# Patient Record
(demographics unavailable — no encounter records)

---

## 2017-01-31 NOTE — HP
COWS





- Scale


Resting Pulse: 2= -120


Sweatin= Chills/Flushing


Restless Observation: 3= Extraneous Movement


Pupil Size: 0= Normal to Room Light


Bone or Joint Aches: 1= Mild Discomfort


Runny Nose/ Eye Tearin= Runny Nose/Eyes


GI Upset > 30mins: 3= Vomiting/Diarrhea


Tremor Observation: 2= Slight Tremor Visible


Yawning Observation: 0= None


Anxiety or Irritability: 2=Irritable/Anxious


Goose Flesh Skin: 0=Smooth Skin


COWS Score: 16





Admission [facility]





- [facility]


Chief Complaint: 


withdrawal sx


Allergies/Adverse Reactions: 


 Allergies











Allergy/AdvReac Type Severity Reaction Status Date / Time


 


No Known Allergies Allergy   Verified 17 17:49











History of Present Illness: 


30 years old male with long history of opiate nicotine dependence, denies 

medical issue has bipolar ii longest sobriety 3 years is admitted to detox


Exam Limitations: No Limitations





- Ebola screening


Have you traveled outside of the country in the last 21 days: No


Have you had contact with anyone from an Ebola affected area: No


Have you been sick,other than usual withdrawal symptoms: No


Do you have a fever: No





- Review of Systems


Constitutional: Chills, Loss of Appetite, Changes in sleep, Unexplained wgt Loss


EENT: reports: Blurred Vision (left eye x "months")


Respiratory: reports: Cough, SOB at Rest


Cardiac: reports: Palpitations


GI: reports: Diarrhea, Nausea, Poor Appetite, Poor Fluid Intake, Vomiting, 

Indigestion, Abdominal cramping


: reports: No Symptoms Reported


Musculoskeletal: reports: Back Pain


Integumentary: reports: No Symptoms Reported


Neuro: reports: Tremors


Endocrine: reports: No Symptoms Reported


Hematology: reports: No Symptoms Reported


Psychiatric: reports: Judgement Intact, Orientated x3


Other Systems: Reviewed and Negative





Patient History





- Patient Medical History


Hx Anemia: No


Hx Asthma: No


Hx Chronic Obstructive Pulmonary Disease (COPD): No


Hx Cancer: No


Hx Cardiac Disorders: No


Hx Congestive Heart Failure: No


Hx Hypertension: No


Hx Hypercholesterolemia: No


Hx Pacemaker: No


HX Cerebrovascular Accident: No


Hx Seizures: No


Hx Dementia: No


Hx Diabetes: No


Hx Gastrointestinal Disorders: No


Hx Liver Disease: No


Hx Genitourinary Disorders: No


Hx Sexually Transmitted Disorders: No


Hx Renal Disease (ESRD): No


Hx Thyroid Disease: No


Hx Human Immunodeficiency Virus (HIV): No


Hx Hepatitis C: No


Hx Depression: No


Hx Suicide Attempt: No


Hx Bipolar Disorder: Yes


Hx Schizophrenia: No





- Patient Surgical History


Past Surgical History: No





- PPD History


Previous Implant?: Yes


Documented Results: Negative w/o proof


Implanted On Prior [facility] Admission?: No


PPD to be Administered?: Yes





- Smoking Cessation


Smoking history: Current every day smoker


Have you smoked in the past 12 months: Yes


Aproximately how many cigarettes per day: 10


Cigars Per Day: 0


Hx Chewing Tobacco Use: No


Initiated information on smoking cessation: Yes


'Breaking Loose' booklet given: 17





- Substance & Tx. History


Hx Alcohol Use: No


Hx Substance Use: Yes


Substance Use Type: Cocaine, Marijuana, Opiates


Hx Substance Use Treatment: Yes





- Substances Abused


  ** Heroin


Route: Inhalation


Frequency: Daily


Amount used: 20 bags


Age of first use: 20


Date of Last Use: 17





Family Disease History





- Family Disease History


Family Disease History: Diabetes: Father, Heart Disease: Father, Other: Mother (

kidney)





Admission Physical Exam [facility]





- Vital Signs


Vital Signs: 


 Vital Signs - 24 hr











  17





  17:34


 


Temperature 99.2 F


 


Pulse Rate 102 H


 


Respiratory 20





Rate 


 


Blood Pressure 126/81














- Physical


General Appearance: Yes: Appropriately Dressed, Moderate Distress, Thin, 

Tremorous, Irritable, Sweating, Anxious


HEENTM: Yes: Hearing grossly Normal, Normal ENT Inspection, Normocephalic, 

Normal Voice


Neck: Yes: Supple, Trachea in good position


Breast: Yes: Breasts Symetrical


Cardiology: Yes: Tachycardia


Abdominal: Yes: Non Tender, Soft, Increased Bowel Sounds


Genitourinary: Yes: Within Normal Limits


Back: Yes: Normal Inspection


Musculoskeletal: Yes: full range of Motion, Gait Steady, Back pain


Extremities: Yes: Normal Range of Motion, Non-Tender, Tremors


Neurological: Yes: Fully Oriented, Alert, Motor Strength 5/5, Normal Response


Integumentary: Yes: Warm


Lymphatic: Yes: Within Normal Limits





- Diagnostic


(1) Opioid dependence with withdrawal


Current Visit: Yes   Status: Acute





(2) Cocaine dependence, uncomplicated


Current Visit: Yes   Status: Chronic





(3) Cannabis dependence, uncomplicated


Current Visit: Yes   Status: Chronic





(4) Nicotine dependence


Current Visit: Yes   Status: Acute   Qualifiers: 


     Nicotine product type: cigarettes     Substance use status: in withdrawal 

       Qualified Code(s): F17.213 - Nicotine dependence, cigarettes, with 

withdrawal  





(5) Weight loss


Current Visit: Yes   Status: Acute





(6) Bipolar II disorder


Current Visit: Yes   Status: Suspected





(7) GERD (gastroesophageal reflux disease)


Current Visit: Yes   Status: Acute   Qualifiers: 


     Esophagitis presence: without esophagitis        Qualified Code(s): K21.9 

- Gastro-esophageal reflux disease without esophagitis  








Cleared for Admission S





- Detox or Rehab


[facility] Level of Care: Medically Managed


Detox Regimen/Protocol: Methadone





S Breath Alcohol Content


Breath Alcohol Content: 0





Urine Drug Screen





- Control


Is Test Valid: Yes





- Results


Drug Screen Negative: No


Urine Drug Screen Results: THC-Marijuana, RIYA-Cocaine, OPI-Opiates, MTD-

Methadone, TCA-Tricyclic Antidepress, OXY-Oxycodone

## 2017-02-01 NOTE — PN
[facility] COWS





- Scale


Resting Pulse: 2= -120


Sweatin=Flushed/Facial Moisture


Restless Observation: 1= Difficult to Sit Still


Pupil Size: 0= Normal to Room Light


Bone or Joint Aches: 2= Severe Diffuse Aches


Runny Nose/ Eye Tearin= None


GI Upset > 30mins: 2= Nausea/Diarrhea


Tremor Observation of Outstretched Hands: 2= Slight Tremor Visible


Yawning Observation: 1= 1-2x During Session


Anxiety or Irritability: 2=Irritable/Anxious


Goose Flesh Skin: 3=Piloerection


COWS Score: 17





BHS Progress Note (SOAP)


Subjective: 


nausea


sweats


muscle spasms


shakes


agitation


interrupted sleep


irritable


Objective: 





17 10:39


 Vital Signs











Temperature  95.7 F L  17 09:52


 


Pulse Rate  124 H  17 09:52


 


Respiratory Rate  16   17 09:52


 


Blood Pressure  129/77   17 09:52


 


O2 Sat by Pulse Oximetry (%)      








 Laboratory Tests











  17





  19:45 07:00 07:00


 


WBC   6.7 


 


RBC   4.47 


 


Hgb   13.8 


 


Hct   40.6 


 


MCV   90.9 


 


MCHC   33.9 


 


RDW   13.7 


 


Plt Count   329 


 


MPV   8.3 


 


Sodium    141


 


Potassium    4.2


 


Chloride    104


 


Carbon Dioxide    29


 


Anion Gap    8


 


BUN    12


 


Creatinine    0.9


 


Creat Clearance w eGFR    > 60


 


Random Glucose    87


 


Calcium    9.1


 


Total Bilirubin    0.6


 


AST    21


 


ALT    35


 


Alkaline Phosphatase    79


 


Total Protein    7.8


 


Albumin    4.1


 


Urine Color  Dkyellow  


 


Urine Appearance  Cloudy  


 


Urine pH  7.0  


 


Ur Specific Gravity  1.030  


 


Urine Protein  1+ H  


 


Urine Glucose (UA)  Negative  


 


Urine Ketones  2+ H  


 


Urine Blood  Negative  


 


Urine Nitrite  Negative  


 


Urine Bilirubin  Negative  


 


Urine Urobilinogen  2.0 e.u/dl  


 


Ur Leukocyte Esterase  Negative  


 


Urine RBC  7  


 


Urine WBC  17  


 


Urine Mucus  Many  


 


Urine Yeast  Many  








repeat u/a


awake/alert


ambulating


no acute distress


Assessment: 





17 10:39


withdrawal sx


Plan: 


continue detox


increase fluids


clonidine 0.1mg x one


flexiril 10mg prn

## 2017-02-01 NOTE — EKG
Test Reason : 

Blood Pressure : ***/*** mmHG

Vent. Rate : 085 BPM     Atrial Rate : 085 BPM

   P-R Int : 124 ms          QRS Dur : 092 ms

    QT Int : 366 ms       P-R-T Axes : 037 076 044 degrees

   QTc Int : 435 ms

 

NORMAL SINUS RHYTHM

MODERATE VOLTAGE CRITERIA FOR LVH, MAY BE NORMAL VARIANT

BORDERLINE ECG

NO PREVIOUS ECGS AVAILABLE

Confirmed by DELANEY GAYTAN MD ([id number]) on 2/1/2017 12:41:29 PM

 

Referred By:             Confirmed By:DELANEY GAYTAN MD

## 2017-02-01 NOTE — CONSULT
[facility] Psychiatric Consult





- Data


Date of interview: 02/01/17


Admission source: [facility]


Identifying data: This is 30 years old male with history of Bipolar disorder 

intoxicated with Heroin and Nicotine, Cannabis, Cocaine


Substance Abuse History: - Smoking Cessation.  Smoking history: Current every 

day smoker.  Have you smoked in the past 12 months: Yes.  Aproximately how many 

cigarettes per day: 10.  Cigars Per Day: 0.  Hx Chewing Tobacco Use: No.  

Initiated information on smoking cessation: Yes.  'Breaking Loose' booklet given

: 01/31/17.  - Substance & Tx. History.  Hx Alcohol Use: No.  Hx Substance Use: 

Yes.  Substance Use Type: Cocaine, Marijuana, Opiates.  Hx Substance Use 

Treatment: Yes.  - Substances Abused.  ** Heroin.  Route: Inhalation.  Frequency

: Daily.  Amount used: 20 bags.  Age of first use: 20.  Date of Last Use: 01/30/ 17


Medical History: GERD, Weight loss history


Psychiatric History: Patient reprotsm unclear history of Bipolar disorder, 

demies history of psychiatric admissions, reports no medications taking prior 

to admission


Physical/Sexual Abuse/Trauma History: Denies


Additional Comment: 0bservation.  Detox Unit Care Protocol.  Urine Drug Screen 

Results: THC-Marijuana, RIYA-Cocaine, OPI-Opiates, MTD-Methadone, TCA-Tricyclic 

Antidepress, OXY-Oxycodone





Mental Status Exam





- Mental Status Exam


Alert and Oriented to: Person


Cognitive Function: Fair


Patient Appearance: Well Groomed


Mood: Apprehensive


Affect: Mood Congruent


Patient Behavior: Cooperative


Speech Pattern: Appropriate


Voice Loudness: Normal


Thought Process: Goal Oriented


Thought Disorder: Being Controlled


Hallucinations: Denies


Suicidal Ideation: Denies


Homicidal Ideation: Denies


Sleep: Difficulty falling asleep


Appetite: Fair


Muscle strength/Tone: Normal


Gait/Station: Normal


Additional Comments: 0bservation.  Detox Unit Care Protocol





Psychiatric Findings





- Problem List (Axis 1, 2,3)


(1) Nicotine dependence


Current Visit: Yes   Status: Acute   Qualifiers: 


     Nicotine product type: cigarettes     Substance use status: in withdrawal 

       Qualified Code(s): F17.213 - Nicotine dependence, cigarettes, with 

withdrawal  





(2) Opioid dependence with withdrawal


Current Visit: Yes   Status: Acute





(3) Cannabis dependence, uncomplicated


Current Visit: Yes   Status: Chronic





(4) Cocaine dependence, uncomplicated


Current Visit: Yes   Status: Chronic





(5) Drug-induced mood disorder


Current Visit: Yes   Status: Acute








- Initial Treatment Plan


Initial Treatment Plan: 0bservation.  Detox Unit Care Protocol

## 2017-02-02 NOTE — PN
[facility] COWS





- Scale


Resting Pulse: 1= WA 


Sweatin=Flushed/Facial Moisture


Restless Observation: 1= Difficult to Sit Still


Pupil Size: 0= Normal to Room Light


Bone or Joint Aches: 1= Mild Discomfort


Runny Nose/ Eye Tearin= Runny Nose/Eyes


GI Upset > 30mins: 0= None


Tremor Observation of Outstretched Hands: 2= Slight Tremor Visible


Yawning Observation: 2= >3x During Session


Anxiety or Irritability: 1=Feels Anxious/Irritable


Goose Flesh Skin: 0=Smooth Skin


COWS Score: 12





BHS Progress Note (SOAP)


Subjective: 


nasal excoriation


sweats 


shakes


agitation


interrupted sleep


Objective: 


17 10:27


 Vital Signs











Temperature  96.4 F L  17 09:31


 


Pulse Rate  79   17 09:31


 


Respiratory Rate  18   17 09:31


 


Blood Pressure  116/66   17 09:31


 


O2 Sat by Pulse Oximetry (%)      








 Laboratory Tests











  17





  19:45 07:00 07:00


 


WBC   6.7 


 


RBC   4.47 


 


Hgb   13.8 


 


Hct   40.6 


 


MCV   90.9 


 


MCHC   33.9 


 


RDW   13.7 


 


Plt Count   329 


 


MPV   8.3 


 


Sodium    141


 


Potassium    4.2


 


Chloride    104


 


Carbon Dioxide    29


 


Anion Gap    8


 


BUN    12


 


Creatinine    0.9


 


Creat Clearance w eGFR    > 60


 


Random Glucose    87


 


Calcium    9.1


 


Total Bilirubin    0.6


 


AST    21


 


ALT    35


 


Alkaline Phosphatase    79


 


Total Protein    7.8


 


Albumin    4.1


 


Urine Color  Dkyellow  


 


Urine Appearance  Cloudy  


 


Urine pH  7.0  


 


Ur Specific Gravity  1.030  


 


Urine Protein  1+ H  


 


Urine Glucose (UA)  Negative  


 


Urine Ketones  2+ H  


 


Urine Blood  Negative  


 


Urine Nitrite  Negative  


 


Urine Bilirubin  Negative  


 


Urine Urobilinogen  2.0 e.u/dl  


 


Ur Leukocyte Esterase  Negative  


 


Urine RBC  7  


 


Urine WBC  17  


 


Urine Mucus  Many  


 


Urine Yeast  Many  


 


RPR Titer   


 


Hepatitis C Antibody   














  17





  07:00 07:00


 


WBC  


 


RBC  


 


Hgb  


 


Hct  


 


MCV  


 


MCHC  


 


RDW  


 


Plt Count  


 


MPV  


 


Sodium  


 


Potassium  


 


Chloride  


 


Carbon Dioxide  


 


Anion Gap  


 


BUN  


 


Creatinine  


 


Creat Clearance w eGFR  


 


Random Glucose  


 


Calcium  


 


Total Bilirubin  


 


AST  


 


ALT  


 


Alkaline Phosphatase  


 


Total Protein  


 


Albumin  


 


Urine Color  


 


Urine Appearance  


 


Urine pH  


 


Ur Specific Gravity  


 


Urine Protein  


 


Urine Glucose (UA)  


 


Urine Ketones  


 


Urine Blood  


 


Urine Nitrite  


 


Urine Bilirubin  


 


Urine Urobilinogen  


 


Ur Leukocyte Esterase  


 


Urine RBC  


 


Urine WBC  


 


Urine Mucus  


 


Urine Yeast  


 


RPR Titer  Nonreactive 


 


Hepatitis C Antibody   <0.1














repeat u/a


awake/alert


ambulating


no acute distress





Assessment: 





17 10:29


withdrawal sx


Plan: 


continue detox


increase fluids


repeat u/a result pending


muprocin oint to nasal area ordered

## 2017-02-03 NOTE — PN
[facility] Progress Note (SOAP)


Subjective: 


ALERT,IRRITABLE,ANXIOUS,INTERRUPTED SLEEP,PAIN IN THE BODY AND BACK


Objective: 





02/03/17 10:50


 Vital Signs











Temperature  97.7 F   02/03/17 06:00


 


Pulse Rate  72   02/03/17 06:00


 


Respiratory Rate  18   02/03/17 06:00


 


Blood Pressure  109/60   02/03/17 06:00


 


O2 Sat by Pulse Oximetry (%)      











Assessment: 





02/03/17 10:50


WITHDRAWAL SYMPTOM


Plan: 


CONTINUE DETOX

## 2017-02-04 NOTE — PN
BHS Progress Note (SOAP)


Subjective: 


ALERT,IRRITABLE,ANXIOUS,INTERRUPTED SLEEP


Objective: 





02/04/17 11:44


 Vital Signs











Temperature  97.7 F   02/04/17 10:00


 


Pulse Rate  88   02/04/17 10:00


 


Respiratory Rate  18   02/04/17 10:00


 


Blood Pressure  109/75   02/04/17 10:00


 


O2 Sat by Pulse Oximetry (%)      











Assessment: 





02/04/17 11:44


WITHDRAWAL SYMPTOM


Plan: 


CONTINUE DETOX

## 2017-02-05 NOTE — HP
[facility] MD Rehab Assess/Revision





- Admission History


Ad[facility]hab from: [facility]


Date of Admission to Rehab: 02/05/17





- Vital signs


Vital Signs: 


 Vital Signs











 Period  Temp  Pulse  Resp  BP Sys/Rothman  Pulse Ox


 


 Last 24 Hr  98.7 F  79    112/78  














- Findings


Detox History & Physical reviewed: Yes


Concur with findings: Yes


Comments/Additional Findings: FOR REHAB AS PROTOCOL

## 2017-02-05 NOTE — DS
[facility] Detox Discharge Summary


Admission Date: 


01/31/17





Discharge Date: 02/05/17





- History


Present History: Cannabis Dependence, Cocaine Dependence, Opioid Dependence


Additional Comments: 


FOLLOW UP WITH AFTER CARE PROGRAM AS ARRANGEMENT [facility]


Pertinent Past History: 


GERD


WEIGHT LOSS


BIPOLAR 2 DISORDER 2 DISORDER





- Physical Exam Results


Vital Signs: 


 Vital Signs











Temperature  97.0 F L  02/05/17 07:36


 


Pulse Rate  79   02/05/17 07:36


 


Respiratory Rate  18   02/05/17 07:36


 


Blood Pressure  104/63   02/05/17 07:36


 


O2 Sat by Pulse Oximetry (%)      











Pertinent Admission Physical Exam Findings: 


WITHDRAWAL SYMPTOM





- Treatment


Hospital Course: Detox Protocol Followed, Detoxed Safely, Responded well, 

Discharged Condition Good, Rehab Referral Accepted


Patient has Accepted a Rehab Referral to: [facility]





- Medication


Discharge Medications: 


Ambulatory Orders





Unobtainable [Unobtainable]  01/31/17 











- AMA


Did Patient Leave Against Medical Advice: No

## 2017-02-05 NOTE — PN
[facility] Progress Note (SOAP)


Subjective: 


ALERT,NO COMPLAINT


Objective: 





02/05/17 10:35


 Vital Signs











Temperature  97.0 F L  02/05/17 07:36


 


Pulse Rate  79   02/05/17 07:36


 


Respiratory Rate  18   02/05/17 07:36


 


Blood Pressure  104/63   02/05/17 07:36


 


O2 Sat by Pulse Oximetry (%)      











Assessment: 





02/05/17 10:35


DETOX COMPLETED,NO WITHDRAWAL SYMPTOM


Plan: 


DISCHARGE TODAY,FOLLOW UP WITH AFTER CARE PROGRAM [facility] AS ARRANGEMENT

## 2017-02-08 NOTE — PN
Psychiatric Progress Note


Vital Signs: 


 Vital Signs











 Period  Temp  Pulse  Resp  BP Sys/Rothman  Pulse Ox


 


 Last 24 Hr  98 F  76-83  16-18  116-116/70-70  











Date of Session: 02/08/17


Chief Complaint:: AMA Psychiatrist Discharge Note


HPI: Patient addressing Opoid, Cocaine and Cannabis Dependence comorbid with 

Nicotine Dependence and Substance-Induced Mood Disorder


ROS: GERD


Current Medications: 


Active Medications











Generic Name Dose Route Start Last Admin





  Trade Name Freq  PRN Reason Stop Dose Admin


 


Acetaminophen  650 mg 02/05/17 14:29 02/08/17 10:10





  Tylenol -  PO   650 mg





  Q4H PRN   Administration





  FEVER OR PAIN   


 


Al Hydroxide/Mg Hydroxide  30 ml 02/05/17 14:29  





  Mylanta Oral Suspension -  PO   





  Q6H PRN   





  DYSPEPSIA   


 


Clonidine  0.1 mg 02/06/17 22:00 02/08/17 10:10





  Catapres -  PO 02/09/17 23:59  0.1 mg





  BID LUIS   Administration


 


Cyclobenzaprine HCl  10 mg 02/06/17 13:29 02/08/17 06:17





  Flexeril -  PO 02/09/17 23:59  10 mg





  TID PRN   Administration





  MUSCLE SPASMS   


 


Diphenhydramine HCl  50 mg 02/05/17 14:29 02/07/17 21:45





  Benadryl -  PO   50 mg





  HSMR1 PRN   Administration





  FOR ITCHING   


 


Eucalyptus/Menthol/Phenol/Sorbitol  1 each 02/05/17 14:29  





  Cepastat Lozenge -  MM   





  Q4H PRN   





  SORE THROAT   


 


Guaifenesin  10 ml 02/05/17 14:29  





  Robitussin Dm -  PO   





  Q6H PRN   





  COUGH   


 


Hydroxyzine Pamoate  50 mg 02/05/17 14:29 02/07/17 09:07





  Vistaril -  PO   50 mg





  Q4H PRN   Administration





  AGITATION   


 


Ibuprofen  400 mg 02/05/17 14:29 02/06/17 12:12





  Motrin -  PO   400 mg





  Q6H PRN   Administration





  PAIN   


 


Loperamide HCl  4 mg 02/05/17 14:29  





  Imodium -  PO   





  Q6H PRN   





  DIARRHEA   


 


Magnesium Hydroxide  30 ml 02/05/17 14:29 02/05/17 21:07





  Milk Of Magnesia -  PO   30 ml





  DAILY PRN   Administration





  CONSTIPATION   


 


Nicotine  21 mg 02/05/17 14:30 02/08/17 10:10





  Nicoderm Patch -  TD   Not Given





  DAILY LUIS   


 


Nicotine Polacrilex  2 mg 02/05/17 14:29 02/08/17 10:12





  Nicorette Gum -  BUC   2 mg





  Q2H PRN   Administration





  NICOTINE REPLACEMENT RX   


 


Prenatal Multivit/Folic Acid/Iron  1 tab 02/06/17 10:00 02/08/17 10:10





  Prenatal Vitamins ([facility]) -  PO   1 tab





  DAILY LUIS   Administration


 


Pseudoephedrine/Triprolidine  1 combo 02/05/17 14:29 02/07/17 12:32





  Actifed -  PO   1 combo





  TID PRN   Administration





  NASAL CONGESTION   


 


Thiamine HCl  100 mg 02/05/17 22:00 02/07/17 21:45





  Vitamin B1 -  PO   100 mg





  HS LUIS   Administration


 


Trazodone HCl  100 mg 02/06/17 22:00 02/07/17 23:17





  Desyrel -  PO   Not Given





  HS LUIS   











Current Side Effect: No


Lab tests ordered: Yes


Lab tests reviewed: Yes


Provider note:: Patient requests to leave against medical advice to take care 

of personal issues like paying bills and looking for a place to stay. He was 

determined to leave despite counseling in order to stay and complete the 

program. He was placed on Trazadone 100 mg po HS for insomnia with good result. 

Script for 30 days supply of that medication is electronically transmitted to 

[facility] at [address] [place] [address]. He is stable for discharge 

against medical advice


Total face to face time:: 25





Mental Status Exam





- Mental Status Exam


Alert and Oriented to: Time, Place, Person


Cognitive Function: Fair


Patient Appearance: Well Groomed


Mood: Hopeful, Euthymic


Affect: Appropriate


Patient Behavior: Cooperative


Speech Pattern: Clear


Voice Loudness: Normal


Thought Process: Intact


Thought Disorder: Not Present


Hallucinations: Denies


Suicidal Ideation: Denies


Homicidal Ideation: Denies


Insight/Judgement: Poor


Sleep: Fair


Appetite: Good


Muscle strength/Tone: Normal


Gait/Station: Normal





Psychiatric Treatment Plan





- Problem List


(1) Opioid dependence with withdrawal


Current Visit: No





(2) Cocaine dependence, uncomplicated


Current Visit: No





(3) Cannabis dependence, uncomplicated


Current Visit: Yes





(4) Nicotine dependence


Current Visit: No   Qualifiers: 


     Nicotine product type: cigarettes     Substance use status: in withdrawal 

       Qualified Code(s): F17.213 - Nicotine dependence, cigarettes, with 

withdrawal  





(5) Drug-induced mood disorder


Current Visit: No





(6) GERD (gastroesophageal reflux disease)


Current Visit: No   Qualifiers: 


     Esophagitis presence: without esophagitis        Qualified Code(s): K21.9 

- Gastro-esophageal reflux disease without esophagitis  


Initial treatment plan: Patient is discharged AMA

## 2017-05-25 NOTE — HP
Admission ROS [facility]





- [facility]


Chief Complaint: 


I WANT TO GO TO REHAB


Allergies/Adverse Reactions: 


 Allergies











Allergy/AdvReac Type Severity Reaction Status Date / Time


 


peanut Allergy Severe Swelling Verified 05/25/17 18:12











History of Present Illness: 


31 YEARS OLD MALE WITH LONG HISTORY OF MARIJUANA NICOTINE DEPENDENCE METHADONE 

70 MG IS ADMITTED TO REHAB


Exam Limitations: No Limitations





- Ebola screening


Have you traveled outside of the country in the last 21 days: No


Have you had contact with anyone from an Ebola affected area: No


Have you been sick,other than usual withdrawal symptoms: No


Do you have a fever: No





- Review of Systems


Constitutional: Weight Stable


EENT: reports: No Symptoms Reported


Respiratory: reports: No Symptoms reported


Cardiac: reports: No Symptoms Reported


GI: reports: No Symptoms Reported


: reports: No Symptoms Reported


Musculoskeletal: reports: No Symptoms Reported


Integumentary: reports: No Symptoms Reported


Neuro: reports: No Symptoms reported


Endocrine: reports: No Symptoms Reported


Hematology: reports: No Symptoms Reported


Psychiatric: reports: Judgement Intact, Mood/Affect Appropiate, Orientated x3


Other Systems: Reviewed and Negative





Patient History





- Patient Medical History


Hx Anemia: No


Hx Asthma: No


Hx Chronic Obstructive Pulmonary Disease (COPD): No


Hx Cancer: No


Hx Cardiac Disorders: No


Hx Congestive Heart Failure: No


Hx Hypertension: No


Hx Hypercholesterolemia: No


Hx Pacemaker: No


HX Cerebrovascular Accident: No


Hx Seizures: No


Hx Dementia: No


Hx Diabetes: No


Hx Gastrointestinal Disorders: Yes (Gastritis)


Hx Liver Disease: No


Hx Genitourinary Disorders: No


Hx Sexually Transmitted Disorders: No


Hx Renal Disease (ESRD): No


Hx Thyroid Disease: No


Hx Human Immunodeficiency Virus (HIV): No


Hx Hepatitis C: No


Hx Depression: Yes


Hx Suicide Attempt: No


Hx Bipolar Disorder: No


Hx Schizophrenia: No





- Patient Surgical History


Past Surgical History: No


Hx Neurologic Surgery: No


Hx Cataract Extraction: No


Hx Cardiac Surgery: No


Hx Lung Surgery: No


Hx Breast Surgery: No


Hx Breast Biopsy: No


Hx Abdominal Surgery: No


Hx Appendectomy: No


Hx Cholecystectomy: No


Hx Genitourinary Surgery: No





- PPD History


Previous Implant?: Yes


Documented Results: Negative w/o proof


Implanted On Prior R Admission?: Yes


Date: 02/02/17


PPD to be Administered?: No





- Smoking Cessation


Smoking history: Current every day smoker


Have you smoked in the past 12 months: Yes


Aproximately how many cigarettes per day: 20


Cigars Per Day: 0


Hx Chewing Tobacco Use: No


Initiated information on smoking cessation: Yes


'Breaking Loose' booklet given: 05/25/17





- Substance & Tx. History


Hx Alcohol Use: No


Hx Substance Use: Yes


Substance Use Type: Marijuana


Hx Substance Use Treatment: Yes





- Substances Abused


  ** Marijuana/Hashish


Route: Smoking


Frequency: Daily


Amount used: OZ


Age of first use: 13


Date of Last Use: 05/24/17





Family Disease History





- Family Disease History


Family Disease History: Diabetes: Father, Heart Disease: Father, Other: Mother (

kidney)





Admission Physical Exam B[facility]





- Vital Signs


Vital Signs: 


 Vital Signs - 24 hr











  05/25/17





  12:11


 


Temperature 97.2 F L


 


Pulse Rate 69


 


Respiratory 18





Rate 


 


Blood Pressure 135/77














- Physical


General Appearance: Yes: No Apparent Distress, Appropriately Dressed, Thin


HEENTM: Yes: Hearing grossly Normal, Normal ENT Inspection, Normocephalic, 

Normal Voice, Other (FLOTOR SCHEDULE TO SEE AN OPHTHELMOLOGIST)


Respiratory: Yes: Chest Non-Tender, Lungs Clear, Normal Breath Sounds, No 

Respiratory Distress, No Accessory Muscle Use


Neck: Yes: Supple, Trachea in good position


Breast: Yes: Breasts Symetrical


Cardiology: Yes: Regular Rhythm, Regular Rate, S1, S2


Abdominal: Yes: Non Tender, Soft


Genitourinary: Yes: Within Normal Limits


Musculoskeletal: Yes: full range of Motion, Gait Steady


Extremities: Yes: Normal Inspection, Normal Range of Motion, Non-Tender


Neurological: Yes: Fully Oriented, Alert, Motor Strength 5/5, Normal Mood/Affect

, Normal Response


Integumentary: Yes: Warm


Lymphatic: Yes: Within Normal Limits





- Diagnostic


(1) Cannabis dependence, uncomplicated


Current Visit: Yes   Status: Chronic





(2) GERD (gastroesophageal reflux disease)


Current Visit: Yes   Status: Chronic   Qualifiers: 


     Esophagitis presence: without esophagitis        Qualified Code(s): K21.9 

- Gastro-esophageal reflux disease without esophagitis  





(3) Nicotine dependence


Current Visit: Yes   Status: Acute   Qualifiers: 


     Nicotine product type: cigarettes     Substance use status: in withdrawal 

       Qualified Code(s): F17.213 - Nicotine dependence, cigarettes, with 

withdrawal  





(4) Methadone maintenance therapy patient


Current Visit: Yes   Status: Chronic


Comment: 70 MG VERIFICATION PENDING











Cleared for Admission S





- Detox or Rehab


[facility] Level of Care: Observation Bed


Detox Regimen/Protocol: Not Applicable


Claeared for Rehab Admission: Yes





BHS Breath Alcohol Content


Breath Alcohol Content: 0.08





Urine Drug Screen





- Results


Drug Screen Negative: No


Urine Drug Screen Results: THC-Marijuana, OPI-Opiates, BZO-Benzodiazepines, MTD-

Methadone, TCA-Tricyclic Antidepress

## 2017-05-25 NOTE — HP
Screened but not Admitted





- Documentation of Visit


Screened but not Admitted: Yes


Patient Does Not Meet Criteria for Admission: Yes


Alternative Treatment/Shelter Info Provided: Yes


Additional Information/Explanation: referred back to his [facility] program

## 2017-05-26 NOTE — HP
Psychiatrist Admission





- Data


Date of interview: 05/26/17


Admission source: [facility]


Identifying data: This is the first admission to [address] [place] [address] 

rehabilitation for this 30 yo  H male father of 2.Patient is undomiciled,

supporting himself by odd jobs.


Medical History: GERD


Psychiatric History: Patient reports first contact with psychiatrist was about 8

-9 years ago.patient addressed depressed mood,anxiety,drinking ,using drugs,

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

but he stopped it since had no response.He was not under any psychiatric care 

for a few years.Not on medications.No suicidal attempts,no psychiatric 

admissions.


Physical/Sexual Abuse/Trauma History: denies


Vital Signs: 


 Vital Signs - 24 hr











  05/25/17 05/26/17 05/26/17





  20:40 00:48 03:30


 


Temperature 98.3 F  


 


Pulse Rate 70  


 


Respiratory 18 18 18





Rate   


 


Blood Pressure 113/69  














  05/26/17





  06:37


 


Temperature 97.8 F


 


Pulse Rate 76


 


Respiratory 18





Rate 


 


Blood Pressure 116/73











Allergies/Adverse Reactions: 


 Allergies











Allergy/AdvReac Type Severity Reaction Status Date / Time


 


peanut Allergy Severe Swelling Verified 05/25/17 18:12











Date of last physical exam: 05/25/17


Concur with the findings of this exam: Yes





- Substance Abuse/Tx History


Hx Alcohol Use: Yes (reports drinking since 12 yo,cognac,whiskey)


Hx Substance Use: Yes (cocaine since his teens on/off,heroin since 21 yo (2 

bandles a day0,)


Substance Use Type: Alcohol, Cocaine, Heroin


Hx Substance Use Treatment: Yes (never completed long term inpatient rehab)





- Admission Criteria


Previous failed treatment: Yes


Poor recovery environment: Yes


Comorbidities: Yes


Lacks judgement: Yes





Mental Status Exam





- Mental Status Exam


Alert and Oriented to: Time, Place, Person


Cognitive Function: Grossly Intact


Patient Appearance: Well Groomed


Mood: Sad


Affect: Mood Congruent


Patient Behavior: Cooperative


Speech Pattern: Clear


Voice Loudness: Normal


Thought Process: Goal Oriented


Thought Disorder: Not Present


Hallucinations: Denies


Suicidal Ideation: Denies


Homicidal Ideation: Denies


Insight/Judgement: Fair


Sleep: Fair


Appetite: Fair


Muscle strength/Tone: Normal


Gait/Station: Normal





Psychiatric Findings





- Problem List (Axis 1, 2,3)


(1) Bipolar II disorder


Current Visit: Yes   Status: Suspected





(2) Cannabis dependence, uncomplicated


Current Visit: Yes   Status: Chronic





(3) Cocaine dependence, uncomplicated


Current Visit: Yes   Status: Chronic





(4) GERD (gastroesophageal reflux disease)


Current Visit: Yes   Status: Chronic   Qualifiers: 


     Esophagitis presence: without esophagitis        Qualified Code(s): K21.9 

- Gastro-esophageal reflux disease without esophagitis  





(5) Methadone maintenance therapy patient


Current Visit: Yes   Status: Chronic


Comment: 70 MG VERIFICATION PENDING








(6) Nicotine dependence


Current Visit: Yes   Status: Chronic   Qualifiers: 


     Nicotine product type: cigarettes     Substance use status: in withdrawal 

       Qualified Code(s): F17.213 - Nicotine dependence, cigarettes, with 

withdrawal  





(7) Opioid dependence with withdrawal


Current Visit: Yes   Status: Chronic








- Initial Treatment Plan


Initial Treatment Plan: Will monitor progress.Consider mood stabilizers if 

needed.

## 2017-05-28 NOTE — EKG
Test Reason : 

Blood Pressure : ***/*** mmHG

Vent. Rate : 074 BPM     Atrial Rate : 074 BPM

   P-R Int : 136 ms          QRS Dur : 092 ms

    QT Int : 392 ms       P-R-T Axes : 036 064 035 degrees

   QTc Int : 435 ms

 

NORMAL SINUS RHYTHM

MODERATE VOLTAGE CRITERIA FOR LVH, MAY BE NORMAL VARIANT

EARLY REPOLARIZATION

BORDERLINE ECG

WHEN COMPARED WITH ECG OF 31-JAN-2017 19:28,

NO SIGNIFICANT CHANGE WAS FOUND

Confirmed by MANN BEYER, KIRSTY ([id number]) on 5/28/2017 11:14:59 PM

 

Referred By: Jesus Saeed           Confirmed By:KIRSTY DARNELL MD

## 2017-05-30 NOTE — PN
BHS Progress Note (SOAP)


Subjective: 


bump in genital area after shaving 


Objective: 





05/30/17 12:13


 Vital Signs











Temperature  98.4 F   05/30/17 06:52


 


Pulse Rate  99 H  05/30/17 06:52


 


Respiratory Rate  18   05/30/17 06:52


 


Blood Pressure  138/71   05/30/17 06:52


 


O2 Sat by Pulse Oximetry (%)      








 Laboratory Tests











  05/25/17 05/26/17 05/26/17





  08:00 06:00 06:00


 


WBC   10.3 H D 


 


RBC   4.59 


 


Hgb   14.1 


 


Hct   42.3 


 


MCV   92.2 


 


MCHC   33.2 


 


RDW   13.0 


 


Plt Count   300 


 


MPV   9.1 


 


Neutrophils %   50.0 


 


Lymphocytes %   44.0 H 


 


Monocytes %   4.0 


 


Eosinophils %   1.0 


 


Basophils %   1.0 


 


Differential Comment   Manual diff done 


 


Platelet Estimate   Adequate 


 


Sodium    141


 


Potassium    4.4


 


Chloride    103


 


Carbon Dioxide    29


 


Anion Gap    9


 


BUN    10


 


Creatinine    0.9


 


Creat Clearance w eGFR    > 60


 


Random Glucose    77


 


Calcium    10.1


 


Total Bilirubin    0.3  D


 


AST    51 H D


 


ALT    62  D


 


Alkaline Phosphatase    93


 


Total Protein    7.8


 


Albumin    4.3


 


Urine Color  Ltyellow  


 


Urine Appearance  Clear  


 


Urine pH  7.0  


 


Ur Specific Gravity  1.015  


 


Urine Protein  Negative  


 


Urine Glucose (UA)  Negative  


 


Urine Ketones  Negative  


 


Urine Blood  Negative  


 


Urine Nitrite  Negative  


 


Urine Bilirubin  Negative  


 


Urine Urobilinogen  Negative  


 


Ur Leukocyte Esterase  Negative  


 


RPR Titer   


 


HIV 1&2 Antibody Screen   


 


HIV P24 Antigen   














  05/26/17 05/26/17





  06:00 06:00


 


WBC  


 


RBC  


 


Hgb  


 


Hct  


 


MCV  


 


MCHC  


 


RDW  


 


Plt Count  


 


MPV  


 


Neutrophils %  


 


Lymphocytes %  


 


Monocytes %  


 


Eosinophils %  


 


Basophils %  


 


Differential Comment  


 


Platelet Estimate  


 


Sodium  


 


Potassium  


 


Chloride  


 


Carbon Dioxide  


 


Anion Gap  


 


BUN  


 


Creatinine  


 


Creat Clearance w eGFR  


 


Random Glucose  


 


Calcium  


 


Total Bilirubin  


 


AST  


 


ALT  


 


Alkaline Phosphatase  


 


Total Protein  


 


Albumin  


 


Urine Color  


 


Urine Appearance  


 


Urine pH  


 


Ur Specific Gravity  


 


Urine Protein  


 


Urine Glucose (UA)  


 


Urine Ketones  


 


Urine Blood  


 


Urine Nitrite  


 


Urine Bilirubin  


 


Urine Urobilinogen  


 


Ur Leukocyte Esterase  


 


RPR Titer  Nonreactive 


 


HIV 1&2 Antibody Screen   Negative


 


HIV P24 Antigen   Negative








pt aox3 in nad ambulating 


genital area -shaved  with papule left groin  


Assessment: 





05/30/17 12:15


shaving bump /folliculitis 


Plan: 


doxycycline 100mg bid 


bacitracin oint bid

## 2017-05-30 NOTE — PN
BHS Progress Note


Note: 


c/o left lateral 5th finger cut when cleaning the garbage cane


superficial skin abrasion noted no redness no bleed. washed with soap and water

, pad dry


tetanus im x 1 unable to recall last dosage


continue rehab

## 2017-06-09 NOTE — PN
Psychiatric Progress Note


Vital Signs: 


 Vital Signs











 Period  Temp  Pulse  Resp  BP Sys/[place]  Pulse Ox


 


 Last 24 Hr  98.0 F  79  18-18  103/59  











Date of Session: 06/09/17


Chief Complaint:: discharge visit


HPI: Patient has addressed cocaine, opioid, cannabis, nicotine dependence 

comorbid Bipolar II disorder.


ROS: WNL


Current Medications: 


Active Medications











Generic Name Dose Route Start Last Admin





  Trade Name Freq  PRN Reason Stop Dose Admin


 


Acetaminophen  650 mg 05/25/17 20:05 05/27/17 21:53





  Tylenol -  PO   650 mg





  Q4H PRN   Administration





  PAIN   


 


Al Hydroxide/Mg Hydroxide  30 ml 05/25/17 20:05 05/27/17 21:54





  Mylanta Oral Suspension -  PO   30 ml





  Q6H PRN   Administration





  DYSPEPSIA   


 


Bacitracin  0.9 gm 05/30/17 22:00 06/08/17 21:20





  Bacitracin -  TP   Not Given





  BID LUIS   


 


Cyclobenzaprine HCl  10 mg 05/25/17 20:08 06/08/17 10:07





  Flexeril -  PO   10 mg





  TID PRN   Administration





  MUSCLE SPASMS   


 


Diphenhydramine HCl  100 mg 05/26/17 16:13 06/08/17 21:20





  Benadryl -  PO   100 mg





  HS PRN   Administration





  INSOMNIA   


 


Eucalyptus/Menthol/Phenol/Sorbitol  1 each 05/25/17 20:05 06/08/17 22:14





  Cepastat Lozenge -  MM   1 each





  Q4H PRN   Administration





  SORE THROAT   


 


Guaifenesin  10 ml 05/25/17 20:05  





  Robitussin Dm -  PO   





  Q6H PRN   





  COUGH   


 


Hydroxyzine Pamoate  50 mg 05/28/17 00:14 05/28/17 17:23





  Vistaril -  PO   50 mg





  Q6H PRN   Administration





  FOR ITCHING   


 


Loperamide HCl  4 mg 05/25/17 20:05  





  Imodium -  PO   





  Q6H PRN   





  DIARRHEA   


 


Magnesium Citrate  300 ml 05/25/17 20:05  





  Citroma -  PO   





  Q48H PRN   





  CONSTIPATION   


 


Magnesium Hydroxide  30 ml 05/25/17 20:05 05/26/17 13:51





  Milk Of Magnesia -  PO   30 ml





  DAILY PRN   Administration





  CONSTIPATION   


 


Methadone HCl 40 mg/ Methadone  70 mg 06/03/17 06:00 06/09/17 05:51





  HCl 30 mg  PO   70 mg





  DAILY@0600 LUIS   Administration


 


Nicotine  21 mg 05/25/17 20:05  





  Nicoderm Patch -  TD   





  DAILY PRN   





  WITHDRAWAL(CONT SUBST)   


 


Nicotine Polacrilex  2 mg 05/30/17 11:51 06/09/17 08:28





  Nicorette Gum -  BUC   2 mg





  Q2H PRN   Administration





  NICOTINE REPLACEMENT RX   


 


Prenatal Multivit/Folic Acid/Iron  1 tab 05/26/17 10:00 06/08/17 10:06





  Prenatal Vitamins (Sjr) -  PO   1 tab





  DAILY LUIS   Administration


 


Pseudoephedrine/Triprolidine  1 combo 05/25/17 20:05  





  Actifed -  PO   





  TID PRN   





  NASAL CONGESTION   


 


Ranitidine HCl  150 mg 05/25/17 22:00 06/08/17 21:20





  Zantac -  PO   150 mg





  BID LUIS   Administration


 


Thiamine HCl  100 mg 05/25/17 22:00 06/08/17 21:20





  Vitamin B1 -  PO   100 mg





  HS LUIS   Administration











Current Side Effect: No


Lab tests ordered: No


Lab tests reviewed: Yes


Provider note:: Patient has completed today his treatment and met his goals, 

will continue to address his issues at [facility] next level of care. Patient reports 

though this treatment  he learned importance of changing attitudes/behavior for 

the utilization of supports to prevent relapses. Patient was encouraged to use 

alternative ways to cope with stressors, patient is stable for discharge.


Total face to face time:: 35





Mental Status Exam





- Mental Status Exam


Alert and Oriented to: Time, Place, Person


Cognitive Function: Good


Patient Appearance: Well Groomed


Mood: Hopeful


Affect: Appropriate, Mood Congruent


Patient Behavior: Appropriate, Cooperative


Speech Pattern: Clear, Appropriate


Voice Loudness: Normal


Thought Process: Intact, Goal Oriented


Thought Disorder: Not Present


Hallucinations: Denies


Suicidal Ideation: Denies


Homicidal Ideation: Denies


Insight/Judgement: Fair


Sleep: Fair


Appetite: Good


Muscle strength/Tone: Normal


Gait/Station: Normal





Psychiatric Treatment Plan





- Problem List


(1) Cannabis dependence, uncomplicated


Current Visit: Yes





(2) Cocaine dependence, uncomplicated


Current Visit: Yes





(3) Methadone maintenance therapy patient


Current Visit: Yes


Comment: 70 MG VERIFICATION PENDING








(4) Nicotine dependence


Current Visit: Yes   Qualifiers: 


     Nicotine product type: cigarettes     Substance use status: in withdrawal 

       Qualified Code(s): F17.213 - Nicotine dependence, cigarettes, with 

withdrawal  





(5) Opioid dependence


Current Visit: Yes

## 2019-10-26 NOTE — HP
Psychiatrist Admission





- Data


Date of interview: 02/06/17


Admission source: [facility]


Identifying data: This is the first [facility] 

admission for this 30 years old   male, father of a 3 years 

old daughter,  unemployed on food stamp, homeless seeking rehab treatment for 

heroin, cocaine and marijuana.  seeking rehab treatment for heroin and marijuana


Medical History: Unremarkable


Psychiatric History: Reports that his first psychiatric contact was 

approximately 10 months ago when he was admitted to [facility] for acting 

strange. He said that he acted like that becuase he was trying to stop using 

heroin on his own. Reports that he was there for 14 days, diagnosed with 

Bipolar Disorder and treated with Risperdal. Following discharge, he attended 

[facility] program for 2 months and was taken off medication by  the 

staff psychiatrist there. Claims he did not complete the program since he was 

looking for a job.Denies history of suicidal attempt


Physical/Sexual Abuse/Trauma History: Denies history of physical, sexual abuse 

as well as DV relationship


Additional Comment: Reports history of arrests for sleeping in a running car 

and possession of marijuana. Claims the  gave him a fine when he went to 

court


Vital Signs: 


 Vital Signs - 24 hr











  02/05/17 02/06/17 02/06/17





  14:30 00:30 03:30


 


Temperature 98.7 F  


 


Pulse Rate 79  


 


Respiratory  18 18





Rate   


 


Blood Pressure 112/78  














  02/06/17





  06:00


 


Temperature 98.1 F


 


Pulse Rate 74


 


Respiratory 18





Rate 


 


Blood Pressure 107/67











Allergies/Adverse Reactions: 


 Allergies











Allergy/AdvReac Type Severity Reaction Status Date / Time


 


No Known Allergies Allergy   Verified 01/31/17 17:49











Date of last physical exam: 01/31/17


Concur with the findings of this exam: Yes





- Substance Abuse/Tx History


Hx Alcohol Use: No


Hx Substance Use: Yes


Substance Use Type: Cocaine (Started using cocaine at age 20, consumes 20 bags 

daily. Last used on 1/31/17), Heroin (Started using heroin at age 20, consumes 

20 bags daily. Last used on 1/30/17), Marijuana (Started smoking marijuana at 

age 14, consumes 2 bags daily, Last smoked on 1/31/17)


Hx Substance Use Treatment: Yes (multiple previous inpt detox & 2-3 inpt rehab(

incomplete))





- Admission Criteria


Previous failed treatment: Yes


Poor recovery environment: Yes


Lacks judgement: Yes





Mental Status Exam





- Mental Status Exam


Alert and Oriented to: Time, Place, Person


Cognitive Function: Fair


Patient Appearance: Well Groomed


Mood: Anxious


Affect: Appropriate


Patient Behavior: Cooperative


Speech Pattern: Clear


Voice Loudness: Normal


Thought Process: Intact


Thought Disorder: Not Present


Hallucinations: Denies


Suicidal Ideation: Denies


Homicidal Ideation: Denies


Insight/Judgement: Fair


Sleep: Poorly


Appetite: Good


Muscle strength/Tone: Normal


Gait/Station: Normal





Psychiatric Findings





- Problem List (Axis 1, 2,3)


(1) Opioid dependence with withdrawal


Current Visit: No   Status: Acute





(2) Cocaine dependence, uncomplicated


Current Visit: No   Status: Chronic





(3) Cannabis dependence, uncomplicated


Current Visit: Yes   Status: Acute





(4) Nicotine dependence


Current Visit: No   Status: Acute   Qualifiers: 


     Nicotine product type: cigarettes     Substance use status: in withdrawal 

       Qualified Code(s): F17.213 - Nicotine dependence, cigarettes, with 

withdrawal  





(5) Drug-induced mood disorder


Current Visit: No   Status: Acute





(6) GERD (gastroesophageal reflux disease)


Current Visit: No   Status: Acute   Qualifiers: 


     Esophagitis presence: without esophagitis        Qualified Code(s): K21.9 

- Gastro-esophageal reflux disease without esophagitis  








- Initial Treatment Plan


Initial Treatment Plan: 1) Start Trazadone 100 mg po HS for insomnia.  2) 

Monitor progress no